# Patient Record
Sex: MALE | Race: WHITE | NOT HISPANIC OR LATINO | Employment: OTHER | URBAN - METROPOLITAN AREA
[De-identification: names, ages, dates, MRNs, and addresses within clinical notes are randomized per-mention and may not be internally consistent; named-entity substitution may affect disease eponyms.]

---

## 2024-06-07 PROBLEM — M25.561 CHRONIC PAIN OF BOTH KNEES: Chronic | Status: ACTIVE | Noted: 2024-06-07

## 2024-06-07 PROBLEM — G89.29 CHRONIC PAIN OF BOTH KNEES: Chronic | Status: ACTIVE | Noted: 2024-06-07

## 2024-06-07 PROBLEM — M25.562 CHRONIC PAIN OF BOTH KNEES: Chronic | Status: ACTIVE | Noted: 2024-06-07

## 2024-06-07 PROBLEM — M10.9 GOUT: Status: ACTIVE | Noted: 2024-06-07

## 2024-06-07 PROBLEM — Z87.442 HISTORY OF KIDNEY STONES: Status: ACTIVE | Noted: 2024-06-07

## 2024-06-07 PROBLEM — N28.9 RENAL INSUFFICIENCY: Status: ACTIVE | Noted: 2024-06-07

## 2024-06-07 NOTE — ASSESSMENT & PLAN NOTE
This has been mild in nature.  Creatinine and GFR within normal range 5/2024.  Reviewed in Epic - media.  November 2023 renal function had returned to normal.  We will need to continue to observe renal function as Medicare and allopurinol both can affect the renal function.

## 2024-06-07 NOTE — ASSESSMENT & PLAN NOTE
Symptoms began approximately 2008.  More frequent flares through time.  Never crystal proven.  Classic podagra.  Current prescription allopurinol 300 mg daily, as needed Mitigare    Doing well.  No flares in interim.  Uric acid was 4.8 November 2023.  Continue allopurinol daily.  This is well-tolerated.  He takes Mitigare only as needed.  Lab order provided today.  He had labs at [facility] May 2024.  Uric acid was 4.1.  Rest of labs stable.  Recheck in 6 months with Dr. Rosas.  Ayaan to do virtual visit next visit due to longer drive. He will have to be seen in person at least every other visit.

## 2024-06-10 ENCOUNTER — OFFICE VISIT (OUTPATIENT)
Age: 64
End: 2024-06-10
Payer: COMMERCIAL

## 2024-06-10 VITALS
BODY MASS INDEX: 31.44 KG/M2 | WEIGHT: 245 LBS | SYSTOLIC BLOOD PRESSURE: 140 MMHG | HEIGHT: 74 IN | HEART RATE: 79 BPM | DIASTOLIC BLOOD PRESSURE: 90 MMHG | TEMPERATURE: 98 F

## 2024-06-10 DIAGNOSIS — M1A.09X0 CHRONIC GOUT OF MULTIPLE SITES, UNSPECIFIED CAUSE: Primary | ICD-10-CM

## 2024-06-10 DIAGNOSIS — N28.9 RENAL INSUFFICIENCY: ICD-10-CM

## 2024-06-10 DIAGNOSIS — M25.561 CHRONIC PAIN OF BOTH KNEES: Chronic | ICD-10-CM

## 2024-06-10 DIAGNOSIS — Z87.442 HISTORY OF KIDNEY STONES: ICD-10-CM

## 2024-06-10 DIAGNOSIS — G89.29 CHRONIC PAIN OF BOTH KNEES: Chronic | ICD-10-CM

## 2024-06-10 DIAGNOSIS — M25.562 CHRONIC PAIN OF BOTH KNEES: Chronic | ICD-10-CM

## 2024-06-10 DIAGNOSIS — R21 RASH: ICD-10-CM

## 2024-06-10 PROCEDURE — 99214 OFFICE O/P EST MOD 30 MIN: CPT | Performed by: NURSE PRACTITIONER

## 2024-06-10 NOTE — ASSESSMENT & PLAN NOTE
Right forearm rash consistent with poison ivy.  He can try calamine lotion or other antihistamine lotions for itching.

## 2024-06-10 NOTE — PROGRESS NOTES
Office Visit       Date: 06/10/2024   Patient Name: Gorge Villegas  MRN: [id number]  YOB: 1960    Referring Physician: Inderjit Porter MD     Chief Complaint: Gout    History of Present Illness: oGrge Villegas is a 63 y.o. male who is here today for follow-up of gout.  Today he reports feeling better.  He rates his pain 1 out of 10 and has 10 minutes of morning stiffness.  He does have possible poison ivy.  He reports back and joint pain without joint swelling.  He has neck pain and neck stiffness as well.  He declines refills today.      Subjective   Review of Systems:  Review of Systems   Musculoskeletal:  Positive for back pain, neck pain and neck stiffness.   Skin:  Positive for rash.   All other systems reviewed and are negative.       Past Medical History:   Past Medical History:   Diagnosis Date    Gout     Hypertension     Kidney stones     Sleep apnea        Past Surgical History:   Past Surgical History:   Procedure Laterality Date    CARDIAC ABLATION      CYSTOSCOPY ELECTROHYDRAULIC LITHOTRIPSY      LASIK      NOSE SURGERY      PERICARDIOCENTESIS      ablation    SHOULDER SURGERY      rotator cuff       Family History:   Family History   Problem Relation Age of Onset    Heart disease Mother     Breast cancer Mother         malignant neoplasm of breast in first degree relative    Heart disease Father     Colon cancer Father     Cancer Father         malignant neoplasm of urinary bladder       Social History:   Social History     Socioeconomic History    Marital status:    Tobacco Use    Smoking status: Never    Smokeless tobacco: Never   Vaping Use    Vaping status: Never Used   Substance and Sexual Activity    Alcohol use: Yes     Comment: OCC    Drug use: No    Sexual activity: Defer       Medications:   Current Outpatient Medications:     allopurinol (ZYLOPRIM) 300 MG tablet, Take 1 tablet by mouth Daily., Disp: , Rfl:     amLODIPine (NORVASC) 10 MG tablet, Take 1 tablet  "by mouth Daily., Disp: , Rfl:     colchicine 0.6 MG capsule capsule, Take 1 capsule by mouth As Needed., Disp: , Rfl: 3    olmesartan (BENICAR) 40 MG tablet, Take 1 tablet by mouth Daily., Disp: , Rfl:     omeprazole (priLOSEC) 20 MG capsule, TAKE 1 CAPSULE BY MOUTH EVERY DAY, Disp: , Rfl: 11    valsartan-hydrochlorothiazide (DIOVAN-HCT) 320-12.5 MG per tablet, , Disp: , Rfl:     oseltamivir (TAMIFLU) 75 MG capsule, 1 po BID x 5d (Patient not taking: Reported on 6/10/2024), Disp: 10 capsule, Rfl: 0    Allergies: No Known Allergies    I have reviewed and updated the patient's chief complaint, history of present illness, review of systems, past medical history, surgical history, family history, social history, medications and allergy list as appropriate.     Objective    Vital Signs:   Vitals:    06/10/24 0913   BP: 140/90   BP Location: Left arm   Pulse: 79   Temp: 98 °F (36.7 °C)   Weight: 111 kg (245 lb)   Height: 188 cm (74\")   PainSc:   1   PainLoc: Generalized     Body mass index is 31.46 kg/m².   BMI is >= 30 and <35. (Class 1 Obesity). The following options were offered after discussion;: exercise counseling/recommendations.       Physical Exam:  Physical Exam  Vitals reviewed.   Constitutional:       Appearance: Normal appearance.   HENT:      Head: Normocephalic and atraumatic.      Mouth/Throat:      Mouth: Mucous membranes are moist.   Eyes:      Conjunctiva/sclera: Conjunctivae normal.   Cardiovascular:      Rate and Rhythm: Normal rate and regular rhythm.      Pulses: Normal pulses.      Heart sounds: Normal heart sounds.   Pulmonary:      Effort: Pulmonary effort is normal.      Breath sounds: Normal breath sounds.   Musculoskeletal:      Cervical back: Normal range of motion and neck supple.      Comments: Left shoulder with crepitus and decreased range of motion, tenderness with ROM.  Tender right lateral epicondyle with mild inflammation on exam.   Skin:     General: Skin is warm and dry.      " Findings: Rash present. Rash is pustular.             Comments: Erythmeatous.   Neurological:      General: No focal deficit present.      Mental Status: He is alert and oriented to person, place, and time. Mental status is at baseline.   Psychiatric:         Mood and Affect: Mood normal.         Behavior: Behavior normal.         Thought Content: Thought content normal.         Judgment: Judgment normal.          Results Review:   Imaging Results (Last 24 Hours)       ** No results found for the last 24 hours. **            Procedures    Assessment / Plan    Assessment/Plan:   Diagnoses and all orders for this visit:    1. Chronic gout of multiple sites, unspecified cause (Primary)  Assessment & Plan:  Symptoms began approximately 2008.  More frequent flares through time.  Never crystal proven.  Classic podagra.  Current prescription allopurinol 300 mg daily, as needed Mitigare    Doing well.  No flares in interim.  Uric acid was 4.8 November 2023.  Continue allopurinol daily.  This is well-tolerated.  He takes Mitigare only as needed.  Lab order provided today.  He [facility] labs at [facility] May 2024.  Uric acid was 4.1.  Rest of labs stable.  Recheck in 6 months with Dr. Sera Kuo to do virtual visit next visit due to longer drive. He will have to be seen in person at least every other visit.    Orders:  -     Uric Acid; Future  -     CBC With Manual Differential; Future  -     Comprehensive Metabolic Panel; Future  -     C-reactive Protein; Future  -     Sedimentation Rate; Future    2. Chronic pain of both knees  Assessment & Plan:  Degenerative in nature.  Pain has been stable.      3. History of kidney stones  Assessment & Plan:  Type unknown.  No recurrence recently.      4. Renal insufficiency  Assessment & Plan:  This has been mild in nature.  Creatinine and GFR within normal range 5/2024.  Reviewed in Epic - media.  November 2023 renal function had returned to normal.  We will need to continue to observe  renal function as Medicare and allopurinol both can affect the renal function.    Orders:  -     Comprehensive Metabolic Panel; Future    5. Rash  Assessment & Plan:  Right forearm rash consistent with poison ivy.  He can try calamine lotion or other antihistamine lotions for itching.          Follow Up:   Return in about 6 months (around 12/10/2024) for Dr. Rosas, Video visit.        DAIJA Jacques  [facility] [place]

## 2024-07-23 ENCOUNTER — TELEPHONE (OUTPATIENT)
Age: 64
End: 2024-07-23
Payer: COMMERCIAL

## 2024-07-29 RX ORDER — ALLOPURINOL 300 MG/1
300 TABLET ORAL DAILY
Qty: 90 TABLET | Refills: 1 | Status: SHIPPED | OUTPATIENT
Start: 2024-07-29

## 2024-12-05 NOTE — PROGRESS NOTES
Telehealth E-Visit      Date: 12/10/2024   Patient Name: Gorge Villegas  : 1960   MRN: [id number]     Chief Complaint: Gout      I have reviewed the E-Visit questionnaire and the patient's answers, my assessment and plan are listed below.     This provider is located at the [facility] ([facility]), [address], [address] [place] [address] using a secure [employer] Video Visit through [employer]. Patient is being seen remotely via telehealth at their home address in [place], and stated they are in a secure environment for this session. The patient's condition being diagnosed/treated is appropriate for telemedicine. The provider identified herself as well as her credentials. The patient, and/or patients guardian, consent to be seen remotely, and when consent is given they understand that the consent allows for patient identifiable information to be sent to a third party as needed. They may refuse to be seen remotely at any time. The electronic data is encrypted and password protected, and the patient and/or guardian has been advised of the potential risks to privacy not withstanding such measures.    You have chosen to receive care through a telehealth visit. Do you consent to use a video/audio connection for your medical care today? Yes    History of Present Illness: Gorge Villegas is a 64 y.o. male who is being seen today to follow up with gout.  He continues to do well.  No gout flares since 2021.  No SE's of allopurinol.   He has an aortic aneurysm which was reevaluated in the spring and is apparently stable.  No surgery needed at this time.  The problem is improving. Attacks occur very rarely. Location(s) affected: right knee, bilateral ankle, bilateral foot and big toe. Patient describes the pain during attacks as aching, throbbing, sharp and burning. Aggravated by overuse.  Relieving factors include oral steroids, Colchicine  and Allopurinol.  Pertinent negatives include fever, joint pain, kidney stones, rash, tophi and renal insufficiency.      Subjective      Review of Systems:   Review of Systems    I have reviewed and updated the patient's chief complaint, history of present illness, review of systems, past medical history, surgical history, family history, social history, medications and allergy list as appropriate.     Medications:     Current Outpatient Medications:     allopurinol (ZYLOPRIM) 300 MG tablet, Take 1 tablet by mouth Daily., Disp: 90 tablet, Rfl: 1    amLODIPine (NORVASC) 10 MG tablet, Take 1 tablet by mouth Daily., Disp: , Rfl:     colchicine 0.6 MG capsule capsule, Take 1 capsule by mouth 2 (Two) Times a Day As Needed., Disp: , Rfl: 3    olmesartan (BENICAR) 40 MG tablet, Take 1 tablet by mouth Daily., Disp: , Rfl:     omeprazole (priLOSEC) 20 MG capsule, TAKE 1 CAPSULE BY MOUTH EVERY DAY, Disp: , Rfl: 11    oseltamivir (TAMIFLU) 75 MG capsule, 1 po BID x 5d (Patient not taking: Reported on 6/10/2024), Disp: 10 capsule, Rfl: 0    valsartan-hydrochlorothiazide (DIOVAN-HCT) 320-12.5 MG per tablet, , Disp: , Rfl:     Allergies:   No Known Allergies    Objective     Physical Exam:  Vital Signs: There were no vitals filed for this visit.  There is no height or weight on file to calculate BMI.    Physical Exam  Limited Exam due to Telehealth Visit.   General: Alert, cooperative, appears well groomed, no acute distress  Eyes: Vision grossly intact   Hearing: grossly intact   Psychiatric: Good eye contact, normal effect.   Skin: no facial rash.     Assessment / Plan      Assessment/Plan:    1. Chronic gout of multiple sites, unspecified cause (Primary)  Assessment & Plan:  Symptoms began approximately 2008.  More frequent flares through time.  Never crystal proven.  Classic podagra.  Current prescription allopurinol 300 mg daily, as needed Mitigare     He continues to do well.  No flares in interim.    Continue allopurinol  daily.  This is well-tolerated.  He has not needed colchicine in over 2 years.  Lab order will be mailed to the patient.  Recheck in 6 months.      2. Chronic pain of both knees  Assessment & Plan:  Degenerative in nature.  Pain has been stable and mild.         3. History of kidney stones  Assessment & Plan:  Type unknown.  No recurrence recently.      4. Renal insufficiency  Assessment & Plan:  This has been mild in nature.      Follow Up:   No follow-ups on file.    Any medications prescribed have been sent electronically to   [facility]/pharmacy #[id number] - [place], [place] - [facility] - [phone]  - [phone] [address] [address]  Phone: [phone] Fax: [phone]      30 minutes were spent reviewing the patient's questionnaire, formulating a treatment plan, and relaying information to the patient via video    Kevin Rosas MD  [facility]  12/10/24  12:30 EST

## 2024-12-10 ENCOUNTER — TELEMEDICINE (OUTPATIENT)
Age: 64
End: 2024-12-10
Payer: COMMERCIAL

## 2024-12-10 DIAGNOSIS — N28.9 RENAL INSUFFICIENCY: ICD-10-CM

## 2024-12-10 DIAGNOSIS — M1A.09X0 CHRONIC GOUT OF MULTIPLE SITES, UNSPECIFIED CAUSE: Primary | ICD-10-CM

## 2024-12-10 DIAGNOSIS — R21 RASH: ICD-10-CM

## 2024-12-10 DIAGNOSIS — M25.562 CHRONIC PAIN OF BOTH KNEES: ICD-10-CM

## 2024-12-10 DIAGNOSIS — M25.561 CHRONIC PAIN OF BOTH KNEES: ICD-10-CM

## 2024-12-10 DIAGNOSIS — Z87.442 HISTORY OF KIDNEY STONES: ICD-10-CM

## 2024-12-10 DIAGNOSIS — G89.29 CHRONIC PAIN OF BOTH KNEES: ICD-10-CM

## 2024-12-10 PROCEDURE — 99214 OFFICE O/P EST MOD 30 MIN: CPT | Performed by: INTERNAL MEDICINE

## 2024-12-10 RX ORDER — ALLOPURINOL 300 MG/1
300 TABLET ORAL DAILY
Qty: 90 TABLET | Refills: 1 | Status: SHIPPED | OUTPATIENT
Start: 2024-12-10

## 2025-01-02 ENCOUNTER — PATIENT MESSAGE (OUTPATIENT)
Age: 65
End: 2025-01-02
Payer: COMMERCIAL